# Patient Record
(demographics unavailable — no encounter records)

---

## 2025-05-27 NOTE — HISTORY OF PRESENT ILLNESS
[Right] : right hand dominant [FreeTextEntry1] : Patient presents for chronic right wrist pain with exacerbation over the last 3 years.  Initially had ulnar-sided wrist pain both sides and eventually underwent arthroscopy on both sides in 2022.  Inflammation seen according to the family.  Subsequently has had PRP injection in bilateral wrist in March 2022 with no improvement.  He saw Dr. REARDON who recommended getting an MRI. Patient reports that he had an arthroscopy of bilateral wrist in 2022 by Dr. Perez.  He also states that he has had few traumas in [employer] working out and had cortisone injections.   MRI of the left wrist demonstrated high signal along the palmar aspect of the distal ulna extending to the prestyloid recess and the pisiform triquetral joints without high-grade tear of the articular this.  Distal radioulnar joint effusion is associated and there is a small pisiform triquetral effusion as well.  Mild ulnar edema is without fracture. MRI of the right wrist demonstrated thickening of the peritenon around the extensor carpi ulnaris with a prominent tendinosis or tear.  Joint effusion decompresses dorsally and into the dorsal capsule.  Mild high signal in the 2nd and 3rd metacarpal bases without defined fracture.  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  No history of inflammatory arthritis.

## 2025-05-27 NOTE — PHYSICAL EXAM
[de-identified] : Well-healed arthroscopy portals bilaterally.  On the right side tender over the radiocarpal joint.  He has wrist extension of 70 and wrist flexion of 60.  Digital extensors and EPL intact. [de-identified] : PA lateral oblique x-rays of both wrist for comparison to assess bony changes were taken.  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  Midcarpal joint well-preserved.  Left wrist well-preserved without evidence of arthritic changes

## 2025-05-27 NOTE — ASSESSMENT
[FreeTextEntry1] : Patient has an unusual constellation of symptoms.  Had bilateral wrist pain requiring arthroscopy where "inflammation" was seen in 2022.  Also had OCD left elbow.  No history of inflammatory thrice.  X-rays now show severe radial scaphoid and radiolunate arthritis.  Recommend an MRI of the right wrist to assess the proximal carpal row.  Patient does not report that postoperatively there was an infection on the right-hand side.  No history of inflammatory arthritis but certainly infection or autoimmune arthritis would be my top possibilities.  Also discussed Preiser's disease but typically the radiolunate joint well-preserved